# Patient Record
Sex: MALE | Race: WHITE
[De-identification: names, ages, dates, MRNs, and addresses within clinical notes are randomized per-mention and may not be internally consistent; named-entity substitution may affect disease eponyms.]

---

## 2021-02-03 ENCOUNTER — HOSPITAL ENCOUNTER (OUTPATIENT)
Dept: HOSPITAL 53 - M SDC | Age: 51
Discharge: HOME | End: 2021-02-03
Attending: UROLOGY
Payer: COMMERCIAL

## 2021-02-03 VITALS — BODY MASS INDEX: 31.86 KG/M2 | HEIGHT: 67 IN | WEIGHT: 203 LBS

## 2021-02-03 VITALS — DIASTOLIC BLOOD PRESSURE: 71 MMHG | SYSTOLIC BLOOD PRESSURE: 133 MMHG

## 2021-02-03 DIAGNOSIS — Z79.899: ICD-10-CM

## 2021-02-03 DIAGNOSIS — N20.1: Primary | ICD-10-CM

## 2021-02-03 DIAGNOSIS — Z86.14: ICD-10-CM

## 2021-02-03 DIAGNOSIS — I10: ICD-10-CM

## 2021-02-03 PROCEDURE — 74420 UROGRAPHY RTRGR +-KUB: CPT

## 2021-02-03 PROCEDURE — 52356 CYSTO/URETERO W/LITHOTRIPSY: CPT

## 2021-02-03 PROCEDURE — 82365 CALCULUS SPECTROSCOPY: CPT

## 2021-02-03 PROCEDURE — 88300 SURGICAL PATH GROSS: CPT

## 2021-02-03 NOTE — RO
OPERATIVE NOTE



DATE OF OPERATION: 02/03/2021



PREOPERATIVE DIAGNOSIS: Right ureteral stone.



POSTOPERATIVE DIAGNOSIS: Right ureteral stone.



PROCEDURE: Cystoscopy, right ureteroscopy with laser lithotripsy and basket

extraction of stones, right retrograde pyelogram with intraop interpretation of

images, right ureteral stent placement.



SURGEON: Marito Elena MD



ASSISTANT: None.



ANESTHESIA: General.



OPERATIVE INDICATIONS: This is a 50-year-old male who was found to have

obstructing approximately 8 mm distal right ureteral stone. He is brought to

the operating room today for treatment.



DESCRIPTION OF PROCEDURE: The patient was brought to the operating room and

general anesthesia was induced. Prophylactic antibiotics were infused. He was

placed in the dorsal lithotomy position and prepped and draped in usual sterile

fashion. Rigid cystoscope was inserted in the urethral meatus and advanced into

the bladder. A guidewire was advanced up the right collecting system. I then

went up the right collecting system with short semi-rigid ureteroscope. Inside

the distal ureter 8 mm stone was seen. The stone was fragmented into smaller

pieces using 272 micron laser fiber. All the fragments were then removed using

basket. Once done I then examined the more proximal ureter and no additional

stones were seen. A retrograde pyelogram was performed and was notable for mild

right hydronephrosis without extravasation. Once I was done the ureteroscope

was removed and guidewire was utilized to advance a 6-[place] x 22-32 cm JJ

ureteral stent into the right collecting system. The wire was removed and there

were adequate curls of the stent in right renal pelvis and in the bladder. The

bladder was emptied of all fluids and this marked the conclusion of the

procedure. The patient was taken out of the dorsal lithotomy position, awakened

from anesthesia and transferred to the recovery room in stable condition.



ESTIMATED BLOOD LOSS: 5 mL.



COMPLICATIONS: None.



SPECIMEN: Kidney stone fragments.



PLAN: The patient will follow up in urology clinic in approximately 2 weeks for

stent removal.

## 2021-02-03 NOTE — REP
INDICATION:

RIGHT URETERAL STENT PLACEMENT.



COMPARISON:

None.



TECHNIQUE:

Two views.  11 seconds of fluoroscopy time is reported.



FINDINGS:

A sequence of 2 last image hold fluoroscopically obtained spot radiographs of the

abdomen document right ureteral cannulation, contrast injection, and double-pigtail

stent placement.



IMPRESSION:

Procedural imaging.





<Electronically signed by Yaw Patel > 02/03/21 [id number]

## 2022-02-07 ENCOUNTER — HOSPITAL ENCOUNTER (OUTPATIENT)
Dept: HOSPITAL 53 - M PLAIMG | Age: 52
End: 2022-02-07
Attending: PHYSICIAN ASSISTANT
Payer: COMMERCIAL

## 2022-02-07 DIAGNOSIS — Z87.442: Primary | ICD-10-CM

## 2022-02-07 PROCEDURE — 74018 RADEX ABDOMEN 1 VIEW: CPT
